# Patient Record
Sex: FEMALE | Race: ASIAN | Employment: UNEMPLOYED | URBAN - METROPOLITAN AREA
[De-identification: names, ages, dates, MRNs, and addresses within clinical notes are randomized per-mention and may not be internally consistent; named-entity substitution may affect disease eponyms.]

---

## 2017-02-02 ENCOUNTER — OFFICE VISIT (OUTPATIENT)
Dept: FAMILY MEDICINE CLINIC | Facility: CLINIC | Age: 47
End: 2017-02-02

## 2017-02-02 VITALS
BODY MASS INDEX: 18.1 KG/M2 | WEIGHT: 106 LBS | DIASTOLIC BLOOD PRESSURE: 64 MMHG | HEIGHT: 64 IN | SYSTOLIC BLOOD PRESSURE: 90 MMHG | OXYGEN SATURATION: 98 % | HEART RATE: 67 BPM

## 2017-02-02 DIAGNOSIS — E03.9 ACQUIRED HYPOTHYROIDISM: ICD-10-CM

## 2017-02-02 DIAGNOSIS — Z12.31 ENCOUNTER FOR SCREENING MAMMOGRAM FOR MALIGNANT NEOPLASM OF BREAST: ICD-10-CM

## 2017-02-02 DIAGNOSIS — Z11.1 SCREENING-PULMONARY TB: ICD-10-CM

## 2017-02-02 DIAGNOSIS — Z01.419 WELL WOMAN EXAM: Primary | ICD-10-CM

## 2017-02-02 DIAGNOSIS — Z13.1 SCREENING FOR DIABETES MELLITUS: ICD-10-CM

## 2017-02-02 DIAGNOSIS — Z13.0 SCREENING FOR IRON DEFICIENCY ANEMIA: ICD-10-CM

## 2017-02-02 DIAGNOSIS — Z13.220 SCREENING FOR LIPOID DISORDERS: ICD-10-CM

## 2017-02-02 PROCEDURE — 99396 PREV VISIT EST AGE 40-64: CPT | Performed by: FAMILY MEDICINE

## 2017-02-02 PROCEDURE — 99213 OFFICE O/P EST LOW 20 MIN: CPT | Performed by: FAMILY MEDICINE

## 2017-02-02 NOTE — PROGRESS NOTES
Nabeel Maier is a 55year old female here for Patient presents with:   Well Adult: no pap last 2015      HPI:   Patient complains of here for well exam.    Was seen in Aug 2016 in [facility], [place], operated by [facility], [address] was reportedly normal (no anemia) and got rx for levothyroxine SYSTEMS:     Constitutional: no change in weight or appetite. No fever, + fatigue no myalgias. Eye: No change in vision, no discharge, itching or dryness. ENT: No earache or change in hearing.  Rare URIs, no allergies, sinus pain, nosebleed or sore throat tenderness. BREASTS: Texture small, nodular, no dominant or suspicious mass, no nipple inversion or discharge, no axillary adenopathy. LUNGS: clear to auscultation.    CARDIO: Regular rate and rhythm without murmur S3 or S4.  GI: no distention, masses, or in 2018 to 2020). HIV screening declined. Labs ordered: lipid profile, comprehensive metabolic profile, CBC. We recommend exercise 150 minutes per week, combination of aerobic, strength and flexibility.  Diet: recommend low fat, high fiber with whole grains

## 2017-02-02 NOTE — PATIENT INSTRUCTIONS
Tdap (tetanus diphtheria pertussis) booster recommended if not received in previous 10 years. You may have had it 6 years ago. Mammogram recommended every 1-2 years after age 36.  Pap recommended every 3 to 5 years if HPV testing for the virus that causes c

## 2017-02-03 ENCOUNTER — LAB ENCOUNTER (OUTPATIENT)
Dept: LAB | Age: 47
End: 2017-02-03
Attending: FAMILY MEDICINE
Payer: COMMERCIAL

## 2017-02-03 DIAGNOSIS — Z13.0 SCREENING FOR IRON DEFICIENCY ANEMIA: ICD-10-CM

## 2017-02-03 DIAGNOSIS — Z13.220 SCREENING FOR LIPOID DISORDERS: ICD-10-CM

## 2017-02-03 DIAGNOSIS — Z13.1 SCREENING FOR DIABETES MELLITUS: ICD-10-CM

## 2017-02-03 DIAGNOSIS — Z12.31 ENCOUNTER FOR SCREENING MAMMOGRAM FOR MALIGNANT NEOPLASM OF BREAST: ICD-10-CM

## 2017-02-03 DIAGNOSIS — Z11.1 SCREENING-PULMONARY TB: ICD-10-CM

## 2017-02-03 LAB
ALBUMIN SERPL-MCNC: 4.1 G/DL (ref 3.5–4.8)
ALP LIVER SERPL-CCNC: 33 U/L (ref 39–100)
ALT SERPL-CCNC: 21 U/L (ref 14–54)
AST SERPL-CCNC: 17 U/L (ref 15–41)
BASOPHILS # BLD AUTO: 0.04 X10(3) UL (ref 0–0.1)
BASOPHILS NFR BLD AUTO: 1 %
BILIRUB SERPL-MCNC: 0.7 MG/DL (ref 0.1–2)
BUN BLD-MCNC: 12 MG/DL (ref 8–20)
CALCIUM BLD-MCNC: 8.9 MG/DL (ref 8.3–10.3)
CHLORIDE: 108 MMOL/L (ref 101–111)
CHOLEST SMN-MCNC: 191 MG/DL (ref ?–200)
CO2: 26 MMOL/L (ref 22–32)
CREAT BLD-MCNC: 0.71 MG/DL (ref 0.55–1.02)
EOSINOPHIL # BLD AUTO: 0.09 X10(3) UL (ref 0–0.3)
EOSINOPHIL NFR BLD AUTO: 2.3 %
ERYTHROCYTE [DISTWIDTH] IN BLOOD BY AUTOMATED COUNT: 11.9 % (ref 11.5–16)
GLUCOSE BLD-MCNC: 85 MG/DL (ref 70–99)
HCT VFR BLD AUTO: 37.8 % (ref 34–50)
HDLC SERPL-MCNC: 65 MG/DL (ref 45–?)
HDLC SERPL: 2.94 {RATIO} (ref ?–4.44)
HGB BLD-MCNC: 12.5 G/DL (ref 12–16)
IMMATURE GRANULOCYTE COUNT: 0.01 X10(3) UL (ref 0–1)
IMMATURE GRANULOCYTE RATIO %: 0.3 %
LDLC SERPL CALC-MCNC: 114 MG/DL (ref ?–130)
LYMPHOCYTES # BLD AUTO: 1.31 X10(3) UL (ref 0.9–4)
LYMPHOCYTES NFR BLD AUTO: 34.1 %
M PROTEIN MFR SERPL ELPH: 7.7 G/DL (ref 6.1–8.3)
MCH RBC QN AUTO: 31.7 PG (ref 27–33.2)
MCHC RBC AUTO-ENTMCNC: 33.1 G/DL (ref 31–37)
MCV RBC AUTO: 95.9 FL (ref 81–100)
MONOCYTES # BLD AUTO: 0.49 X10(3) UL (ref 0.1–0.6)
MONOCYTES NFR BLD AUTO: 12.8 %
NEUTROPHIL ABS PRELIM: 1.9 X10 (3) UL (ref 1.3–6.7)
NEUTROPHILS # BLD AUTO: 1.9 X10(3) UL (ref 1.3–6.7)
NEUTROPHILS NFR BLD AUTO: 49.5 %
NONHDLC SERPL-MCNC: 126 MG/DL (ref ?–130)
PLATELET # BLD AUTO: 212 10(3)UL (ref 150–450)
POTASSIUM SERPL-SCNC: 4 MMOL/L (ref 3.6–5.1)
RBC # BLD AUTO: 3.94 X10(6)UL (ref 3.8–5.1)
RED CELL DISTRIBUTION WIDTH-SD: 41.8 FL (ref 35.1–46.3)
SODIUM SERPL-SCNC: 140 MMOL/L (ref 136–144)
TRIGLYCERIDES: 61 MG/DL (ref ?–150)
TSI SER-ACNC: 0.53 MIU/ML (ref 0.35–5.5)
VLDL: 12 MG/DL (ref 5–40)
WBC # BLD AUTO: 3.8 X10(3) UL (ref 4–13)

## 2017-02-03 PROCEDURE — 85025 COMPLETE CBC W/AUTO DIFF WBC: CPT

## 2017-02-03 PROCEDURE — 36415 COLL VENOUS BLD VENIPUNCTURE: CPT

## 2017-02-03 PROCEDURE — 80053 COMPREHEN METABOLIC PANEL: CPT

## 2017-02-03 PROCEDURE — 84443 ASSAY THYROID STIM HORMONE: CPT

## 2017-02-03 PROCEDURE — 86480 TB TEST CELL IMMUN MEASURE: CPT

## 2017-02-03 PROCEDURE — 80061 LIPID PANEL: CPT

## 2017-02-07 ENCOUNTER — PATIENT MESSAGE (OUTPATIENT)
Dept: FAMILY MEDICINE CLINIC | Facility: CLINIC | Age: 47
End: 2017-02-07

## 2017-02-08 LAB
M TB TUBERC IFN-G BLD QL: NEGATIVE
M TB TUBERC IFN-G/MITOGEN IGNF BLD: 0 IU/ML
M TB TUBERC IGNF/MITOGEN IGNF CONTROL: >10 IU/ML
MITOGEN IGNF BCKGRD COR BLD-ACNC: 0.04 IU/ML

## 2017-02-22 ENCOUNTER — PATIENT MESSAGE (OUTPATIENT)
Dept: FAMILY MEDICINE CLINIC | Facility: CLINIC | Age: 47
End: 2017-02-22

## 2017-02-23 NOTE — TELEPHONE ENCOUNTER
From: Luiz Maier  To: Steffanie Lui MD  Sent: 2/22/2017 1:35 PM CST  Subject: Test Results Question    Dear Dr. China Perez or nurse,    I came in on 2/2/2017 for a check-up and TB test (please see the email stream below).  However, I still have not see

## 2017-02-23 NOTE — TELEPHONE ENCOUNTER
From: Ruperto Maier  To: Lorena Kuhn MD  Sent: 2/22/2017 1:51 PM CST  Subject: Test Results Question    Dear Dr. Nupur Benitez or nurse,    Please ignore my previous email to you regarding a test result as I just found out it is there.     Thank you,  Vern

## 2018-03-12 ENCOUNTER — OFFICE VISIT (OUTPATIENT)
Dept: FAMILY MEDICINE CLINIC | Facility: CLINIC | Age: 48
End: 2018-03-12

## 2018-03-12 VITALS
OXYGEN SATURATION: 99 % | BODY MASS INDEX: 18.04 KG/M2 | WEIGHT: 107 LBS | DIASTOLIC BLOOD PRESSURE: 66 MMHG | SYSTOLIC BLOOD PRESSURE: 100 MMHG | HEIGHT: 64.5 IN | HEART RATE: 68 BPM | TEMPERATURE: 99 F | RESPIRATION RATE: 15 BRPM

## 2018-03-12 DIAGNOSIS — L30.9 DERMATITIS, UNSPECIFIED: Primary | ICD-10-CM

## 2018-03-12 PROCEDURE — 99212 OFFICE O/P EST SF 10 MIN: CPT | Performed by: FAMILY MEDICINE

## 2018-03-12 NOTE — PATIENT INSTRUCTIONS
I would recommend stopping the face moisturizer in case that's related.     Appt with Dr. Yuliya parrish

## 2018-03-12 NOTE — PROGRESS NOTES
Neil Maier IS A 52year old female HERE FOR Patient presents with:  Rash: All over body . Started 2 months ago, got worse over the past 2 weeks .         History of present illness:     Started with small lesion dorsal right hand, itchy and painful, 2 m Exam:     /66   Pulse 68   Temp 98.7 °F (37.1 °C)   Resp 15   Ht 64.5\"   Wt 107 lb   LMP 03/11/2018   SpO2 99%   BMI 18.08 kg/m²      Diffuse scaly erythematous and occasionally somewhat honey-colored lesions over chest, on neck, both legs below

## 2021-01-28 ENCOUNTER — TELEPHONE (OUTPATIENT)
Dept: FAMILY MEDICINE CLINIC | Facility: CLINIC | Age: 51
End: 2021-01-28

## 2021-03-22 ENCOUNTER — TELEPHONE (OUTPATIENT)
Dept: FAMILY MEDICINE CLINIC | Facility: CLINIC | Age: 51
End: 2021-03-22

## 2023-03-15 ENCOUNTER — TELEPHONE (OUTPATIENT)
Dept: FAMILY MEDICINE CLINIC | Facility: CLINIC | Age: 53
End: 2023-03-15

## 2023-03-21 ENCOUNTER — PATIENT OUTREACH (OUTPATIENT)
Dept: CASE MANAGEMENT | Age: 53
End: 2023-03-21

## 2023-03-21 NOTE — PROCEDURES
The office order for PCP removal request is Approved and finalized on March 21, 2023.     Thanks,  [facility] [employer]

## 2024-04-29 ENCOUNTER — OFFICE VISIT (OUTPATIENT)
Dept: FAMILY MEDICINE CLINIC | Facility: CLINIC | Age: 54
End: 2024-04-29
Payer: COMMERCIAL

## 2024-04-29 VITALS
HEIGHT: 64.53 IN | DIASTOLIC BLOOD PRESSURE: 58 MMHG | WEIGHT: 107.38 LBS | HEART RATE: 78 BPM | OXYGEN SATURATION: 100 % | BODY MASS INDEX: 18.11 KG/M2 | SYSTOLIC BLOOD PRESSURE: 100 MMHG

## 2024-04-29 DIAGNOSIS — E89.0 POST-SURGICAL HYPOTHYROIDISM: Chronic | ICD-10-CM

## 2024-04-29 DIAGNOSIS — Z13.6 SCREENING FOR CARDIOVASCULAR CONDITION: ICD-10-CM

## 2024-04-29 DIAGNOSIS — Z00.00 ENCOUNTER FOR ROUTINE HISTORY AND PHYSICAL EXAMINATION: Primary | ICD-10-CM

## 2024-04-29 DIAGNOSIS — Z12.31 ENCOUNTER FOR SCREENING MAMMOGRAM FOR BREAST CANCER: ICD-10-CM

## 2024-04-29 PROCEDURE — 90715 TDAP VACCINE 7 YRS/> IM: CPT | Performed by: STUDENT IN AN ORGANIZED HEALTH CARE EDUCATION/TRAINING PROGRAM

## 2024-04-29 PROCEDURE — 90471 IMMUNIZATION ADMIN: CPT | Performed by: STUDENT IN AN ORGANIZED HEALTH CARE EDUCATION/TRAINING PROGRAM

## 2024-04-29 PROCEDURE — 99386 PREV VISIT NEW AGE 40-64: CPT | Performed by: STUDENT IN AN ORGANIZED HEALTH CARE EDUCATION/TRAINING PROGRAM

## 2024-04-29 NOTE — PROGRESS NOTES
[facility] [place]    CC: yearly exam     HPI: Neil Maier is 54 year old female here for a yearly physical.  1.  Hypothyroid - takes 75 mcg synthroid.  Reports that she takes synthroid appropriately on an empty stomach, 30 minutes prior to her first meal of the day. Denies thyroid symptoms including palpitations, anxiety, weight loss, hair changes, anxiety, or changes in bowel movements.  2. Healthcare maintenance.  Exercise -.  Sunscreen -.  Caffeine - decaf, no caffeine.  Advanced directives-     PMH:  Patient Active Problem List   Diagnosis    Post-surgical hypothyroidism    Leukopenia    Anemia     No past medical history on file.    Last Physical 4/29/24     SH: reviewed     FH: reviewed     Social History     Social History Narrative    Has a daughter age 8 in second grade 2017. No pets.        ROS: full 10 point review of systems done and otherwise negative.      Healthcare Maintenance:  Pap - declines 4/29/24, states she will return for pap only visit  Tdap: 4/29/24  Mammogram: ordered4/29/24  [place] Requested records - expect these will need to be translated by records department  Shingrix: declines 4/29/24    Health Habits:  Smoking. 0  Alcohol Use. 0  Dental Exam. UTD  Eye Exam. UTD       PE:  Vital Signs    04/29/24 1050   BP: 100/58   Pulse: 78   PainSc: 0 - (None)     Wt Readings from Last 3 Encounters:   04/29/24 107 lb 6.4 oz (48.7 kg)   03/12/18 107 lb (48.5 kg)   02/02/17 106 lb (48.1 kg)     Body mass index is 18.13 kg/m².     General: Comfortable, pleasant, NAD, appears stated age  HEENT.  NC/AT, no conjunctival injection, TMs clear, oropharynx without erythema or exudate, neck supple, no LAD or thyromegaly  CV.  RRR, no m/r/g  Pulm. CTAB, no W/R/R, comfortable work of breathing, speaks in full sentences  Abdomen. Soft, nt, nd  Extremities.  2+ DP pulses, no edema  Skin.  No concerning moles      No visits with results within 4  Week(s) from this visit.   Latest known visit with results is:   [facility] Lab Encounter on 02/03/2017   Component Date Value Ref Range Status    Glucose 02/03/2017 85  70 - 99 mg/dL Final    BUN 02/03/2017 12  8 - 20 mg/dL Final    Creatinine 02/03/2017 0.71  0.55 - 1.02 mg/dL Final    GFR 02/03/2017 102  >=60 Final    Calcium, Total 02/03/2017 8.9  8.3 - 10.3 mg/dL Final    Alkaline Phosphatase 02/03/2017 33 (L)  39 - 100 U/L Final    AST 02/03/2017 17  15 - 41 U/L Final    ALT 02/03/2017 21  14 - 54 U/L Final    Bilirubin, Total 02/03/2017 0.7  0.1 - 2.0 mg/dL Final    Total Protein 02/03/2017 7.7  6.1 - 8.3 g/dL Final    Albumin 02/03/2017 4.1  3.5 - 4.8 g/dL Final    Sodium 02/03/2017 140  136 - 144 mmol/L Final    Potassium 02/03/2017 4.0  3.6 - 5.1 mmol/L Final    Chloride 02/03/2017 108  101 - 111 mmol/L Final    CO2 02/03/2017 26.0  22.0 - 32.0 mmol/L Final    Cholesterol, Total 02/03/2017 191  <200 mg/dL Final    Triglycerides 02/03/2017 61  <150 mg/dL Final    HDL Cholesterol 02/03/2017 65  >45 mg/dL Final    LDL Cholesterol 02/03/2017 114  <130 mg/dL Final    VLDL 02/03/2017 12  5 - 40 mg/dL Final    Chol/HDL Ratio 02/03/2017 2.94  <4.44 Final    Non HDL Chol 02/03/2017 126  <130 mg/dL Final    TSH 02/03/2017 0.533  0.350 - 5.500 mIU/mL Final    Quantiferon TB Gold In-Tube 02/03/2017 Negative  Negative Final    Quantiferon-TB1 Minus NIL 02/03/2017 0.00  0.00 - 0.34 IU/mL Final    Quantiferon Mitogen Minus NIL 02/03/2017 >10.00  IU/mL Final    Quantiferon NIL 02/03/2017 0.04  IU/mL Final    WBC 02/03/2017 3.8 (L)  4.0 - 13.0 x10(3) uL Final    RBC 02/03/2017 3.94  3.80 - 5.10 x10(6)uL Final    HGB 02/03/2017 12.5  12.0 - 16.0 g/dL Final    HCT 02/03/2017 37.8  34.0 - 50.0 % Final    PLT 02/03/2017 212.0  150.0 - 450.0 10(3)uL Final    MCV 02/03/2017 95.9  81.0 - 100.0 fL Final    MCH 02/03/2017 31.7  27.0 - 33.2 pg Final    MCHC 02/03/2017 33.1  31.0 - 37.0 g/dL Final    RDW 02/03/2017 11.9  11.5 - 16.0 % Final     RDW-SD 02/03/2017 41.8  35.1 - 46.3 fL Final    Neutrophil Absolute Prelim 02/03/2017 1.90  1.30 - 6.70 x10 (3) uL Final    Neutrophil Absolute 02/03/2017 1.90  1.30 - 6.70 x10(3) uL Final    Lymphocyte Absolute 02/03/2017 1.31  0.90 - 4.00 x10(3) uL Final    Monocyte Absolute 02/03/2017 0.49  0.10 - 0.60 x10(3) uL Final    Eosinophil Absolute 02/03/2017 0.09  0.00 - 0.30 x10(3) uL Final    Basophil Absolute 02/03/2017 0.04  0.00 - 0.10 x10(3) uL Final    Immature Granulocyte Absolute 02/03/2017 0.01  0.00 - 1.00 x10(3) uL Final    Neutrophil % 02/03/2017 49.5  % Final    Lymphocyte % 02/03/2017 34.1  % Final    Monocyte % 02/03/2017 12.8  % Final    Eosinophil % 02/03/2017 2.3  % Final    Basophil % 02/03/2017 1.0  % Final    Immature Granulocyte % 02/03/2017 0.3  % Final        A/P: Neil Maier is 54 year old yo female here for complete physical.  1. Hypothyroid - blood work ordered, if in range will refill synthroid at the current dose.   2. Healthcare Maintenance. Discussed eye exam, dentist visit q6 months, sunscreen, exercise at least 5x/week, 30 minutes daily, and limiting caffeine intake.      Outpatient Encounter Medications as of 4/29/2024   Medication Sig Dispense Refill    Levothyroxine Sodium (SYNTHROID) 75 MCG Oral Tab Take 1 tablet (75 mcg total) by mouth before breakfast.      [DISCONTINUED] Desoximetasone 0.25 % External Ointment Apply to the affected areas of skin on body (avoid face) BID x 2-3 weeks then PRN flares 200 g 1     No facility-administered encounter medications on file as of 4/29/2024.           Side effects, risks and benefits of medications were explained.  The patient or responsible adult showed the ability to learn, asked appropriate questions.  There were no barriers to learning and they verbalized understanding of the treatment plan.     Medication list provided to patient and /or family member.     This note was created in part by Dragon recognition software.  Please  excuse errors.

## 2024-05-08 ENCOUNTER — HOSPITAL ENCOUNTER (OUTPATIENT)
Dept: MAMMOGRAPHY | Age: 54
Discharge: HOME | End: 2024-05-08
Attending: STUDENT IN AN ORGANIZED HEALTH CARE EDUCATION/TRAINING PROGRAM
Payer: COMMERCIAL

## 2024-05-08 DIAGNOSIS — Z12.31 ENCOUNTER FOR SCREENING MAMMOGRAM FOR BREAST CANCER: ICD-10-CM

## 2024-05-08 PROCEDURE — 77063 BREAST TOMOSYNTHESIS BI: CPT | Performed by: STUDENT IN AN ORGANIZED HEALTH CARE EDUCATION/TRAINING PROGRAM

## 2024-05-08 PROCEDURE — 77067 SCR MAMMO BI INCL CAD: CPT | Performed by: STUDENT IN AN ORGANIZED HEALTH CARE EDUCATION/TRAINING PROGRAM

## 2024-05-10 ENCOUNTER — TELEPHONE (OUTPATIENT)
Dept: FAMILY MEDICINE CLINIC | Facility: CLINIC | Age: 54
End: 2024-05-10

## 2024-05-13 ENCOUNTER — HOSPITAL ENCOUNTER (OUTPATIENT)
Dept: ULTRASOUND IMAGING | Age: 54
Discharge: HOME | End: 2024-05-13
Attending: STUDENT IN AN ORGANIZED HEALTH CARE EDUCATION/TRAINING PROGRAM

## 2024-05-13 DIAGNOSIS — R92.2 INCONCLUSIVE MAMMOGRAM: ICD-10-CM

## 2024-05-13 PROCEDURE — 76642 ULTRASOUND BREAST LIMITED: CPT | Performed by: STUDENT IN AN ORGANIZED HEALTH CARE EDUCATION/TRAINING PROGRAM

## 2024-05-14 NOTE — TELEPHONE ENCOUNTER
The mammogram showed asymmetry in the right breast. Ultrasound was completed which showed a lymph node that spot, thought to be benign. 12 month follow up mammo was recommended.

## 2024-10-02 ENCOUNTER — OFFICE VISIT (OUTPATIENT)
Dept: FAMILY MEDICINE CLINIC | Facility: CLINIC | Age: 54
End: 2024-10-02
Payer: COMMERCIAL

## 2024-10-02 VITALS
HEIGHT: 64.53 IN | WEIGHT: 109 LBS | DIASTOLIC BLOOD PRESSURE: 54 MMHG | BODY MASS INDEX: 18.38 KG/M2 | HEART RATE: 86 BPM | OXYGEN SATURATION: 97 % | SYSTOLIC BLOOD PRESSURE: 88 MMHG

## 2024-10-02 DIAGNOSIS — R05.8 PRODUCTIVE COUGH: Primary | ICD-10-CM

## 2024-10-02 PROCEDURE — 99213 OFFICE O/P EST LOW 20 MIN: CPT | Performed by: STUDENT IN AN ORGANIZED HEALTH CARE EDUCATION/TRAINING PROGRAM

## 2024-10-02 RX ORDER — BENZONATATE 100 MG/1
100 CAPSULE ORAL 3 TIMES DAILY PRN
Qty: 30 CAPSULE | Refills: 0 | Status: SHIPPED | OUTPATIENT
Start: 2024-10-02

## 2024-10-02 NOTE — PROGRESS NOTES
[facility]    Chief Complaint   Patient presents with    Cough     For the past month. Having hard time sleeping during the night. So feeling little bit tired. Cough is productive.        HPI:   Neil Maier is 54 year old patient with hx of hypothyroidism presenting for the followin. Productive cough x 4 weeks. No improvement over hte 4 weeks. Initially had cold/URI symptoms which resolved. At this point she reports no fevers. She does feel cough is productive of clear/white mcuous. No rhinorrhea or congestion. No abdominal pain or nausea. Voice is hoarse from congestion.      Covid test taken initially and negative     PMH:  Patient Active Problem List   Diagnosis    Post-surgical hypothyroidism    Leukopenia    Anemia     No past medical history on file.       SH: reviewed     FH: reviewed        ROS: full 10 point review of systems done and otherwise negative.      Healthcare Maintenance:  Health Maintenance   Topic Date Due    Colorectal Cancer Screening  Never done    Pap Smear  10/20/2018    Zoster Vaccines (1 of 2) Never done    Annual Depression Screening  2024    COVID-19 Vaccine (1 -  season) Never done    Influenza Vaccine (1) 10/01/2024    Annual Physical  2025    Mammogram  2025    DTaP,Tdap,and Td Vaccines (3 - Td or Tdap) 2034    Pneumococcal Vaccine: Birth to 64yrs  Aged Out          PE:  Vital Signs    10/02/24 1524   BP: (!) 88/54   Pulse: 86   PainSc: 0 - (None)     Wt Readings from Last 3 Encounters:   10/02/24 109 lb (49.4 kg)   24 107 lb 6.4 oz (48.7 kg)   18 107 lb (48.5 kg)     Body mass index is 18.4 kg/m².     Physical Exam:  GEN: Well-appearing, NAD, nontoxic  HEENT: NC/AT, PERRL, EOMI, TM without erythema or bulging bilaterally and normal ear canals, no nasal polyps, oropharynx clear without erythema or exudate, MMM  CARD: RRR, no m/r/g  PULM: CTA keegan  ABD: soft, nt, nd  EXT: No gross deformity  NEURO: no gross  deficit  PSYCH: normal affect, thought process linear     No visits with results within 4 Week(s) from this visit.   Latest known visit with results is:   [facility] Lab Encounter on 02/03/2017   Component Date Value Ref Range Status    Glucose 02/03/2017 85  70 - 99 mg/dL Final    BUN 02/03/2017 12  8 - 20 mg/dL Final    Creatinine 02/03/2017 0.71  0.55 - 1.02 mg/dL Final    GFR 02/03/2017 102  >=60 Final    Calcium, Total 02/03/2017 8.9  8.3 - 10.3 mg/dL Final    Alkaline Phosphatase 02/03/2017 33 (L)  39 - 100 U/L Final    AST 02/03/2017 17  15 - 41 U/L Final    ALT 02/03/2017 21  14 - 54 U/L Final    Bilirubin, Total 02/03/2017 0.7  0.1 - 2.0 mg/dL Final    Total Protein 02/03/2017 7.7  6.1 - 8.3 g/dL Final    Albumin 02/03/2017 4.1  3.5 - 4.8 g/dL Final    Sodium 02/03/2017 140  136 - 144 mmol/L Final    Potassium 02/03/2017 4.0  3.6 - 5.1 mmol/L Final    Chloride 02/03/2017 108  101 - 111 mmol/L Final    CO2 02/03/2017 26.0  22.0 - 32.0 mmol/L Final    Cholesterol, Total 02/03/2017 191  <200 mg/dL Final    Triglycerides 02/03/2017 61  <150 mg/dL Final    HDL Cholesterol 02/03/2017 65  >45 mg/dL Final    LDL Cholesterol 02/03/2017 114  <130 mg/dL Final    VLDL 02/03/2017 12  5 - 40 mg/dL Final    Chol/HDL Ratio 02/03/2017 2.94  <4.44 Final    Non HDL Chol 02/03/2017 126  <130 mg/dL Final    TSH 02/03/2017 0.533  0.350 - 5.500 mIU/mL Final    Quantiferon TB Gold In-Tube 02/03/2017 Negative  Negative Final    Quantiferon-TB1 Minus NIL 02/03/2017 0.00  0.00 - 0.34 IU/mL Final    Quantiferon Mitogen Minus NIL 02/03/2017 >10.00  IU/mL Final    Quantiferon NIL 02/03/2017 0.04  IU/mL Final    WBC 02/03/2017 3.8 (L)  4.0 - 13.0 x10(3) uL Final    RBC 02/03/2017 3.94  3.80 - 5.10 x10(6)uL Final    HGB 02/03/2017 12.5  12.0 - 16.0 g/dL Final    HCT 02/03/2017 37.8  34.0 - 50.0 % Final    PLT 02/03/2017 212.0  150.0 - 450.0 10(3)uL Final    MCV 02/03/2017 95.9  81.0 - 100.0 fL Final    MCH 02/03/2017 31.7  27.0 - 33.2 pg Final     MCHC 2017 33.1  31.0 - 37.0 g/dL Final    RDW 2017 11.9  11.5 - 16.0 % Final    RDW-SD 2017 41.8  35.1 - 46.3 fL Final    Neutrophil Absolute Prelim 2017 1.90  1.30 - 6.70 x10 (3) uL Final    Neutrophil Absolute 2017 1.90  1.30 - 6.70 x10(3) uL Final    Lymphocyte Absolute 2017 1.31  0.90 - 4.00 x10(3) uL Final    Monocyte Absolute 2017 0.49  0.10 - 0.60 x10(3) uL Final    Eosinophil Absolute 2017 0.09  0.00 - 0.30 x10(3) uL Final    Basophil Absolute 2017 0.04  0.00 - 0.10 x10(3) uL Final    Immature Granulocyte Absolute 2017 0.01  0.00 - 1.00 x10(3) uL Final    Neutrophil % 2017 49.5  % Final    Lymphocyte % 2017 34.1  % Final    Monocyte % 2017 12.8  % Final    Eosinophil % 2017 2.3  % Final    Basophil % 2017 1.0  % Final    Immature Granulocyte % 2017 0.3  % Final        A/P: Neil Maier is 54 year old presenting for the followin. Productive cough - no improvemetn at all over 4 weeks. Lungs sound clear without wheezing. Will prescribe tessalon perles and CXR. If CXR negative, continue supportive care for suspected post-viral cough. Other etiologies considered included allergies ,  - XR CHEST PA + LAT CHEST (CPT=71046); Future  - benzonatate (TESSALON PERLES) 100 MG Oral Cap; Take 1 capsule (100 mg total) by mouth 3 (three) times daily as needed for cough.  Dispense: 30 capsule; Refill: 0  - humidified air  - tea with lemon and honey  - saline nasal spray/neti pot  - OTC NSAID or tylenol PRN     Outpatient Encounter Medications as of 10/2/2024   Medication Sig Dispense Refill    Levothyroxine Sodium (SYNTHROID) 75 MCG Oral Tab Take 1 tablet (75 mcg total) by mouth before breakfast.       No facility-administered encounter medications on file as of 10/2/2024.           Side effects, risks and benefits of medications were explained.  The patient or responsible adult showed the ability to learn, asked  appropriate questions.  There were no barriers to learning and they verbalized understanding of the treatment plan.     Medication list provided to patient and /or family member.        Rhiannon Mckinney MD

## 2024-12-27 ENCOUNTER — TELEPHONE (OUTPATIENT)
Dept: FAMILY MEDICINE CLINIC | Facility: CLINIC | Age: 54
End: 2024-12-27

## 2024-12-27 NOTE — TELEPHONE ENCOUNTER
Pt is calling because she's had a rash spreading over her body for the last 2-3 days and very itchy and not sure what to do

## 2024-12-27 NOTE — TELEPHONE ENCOUNTER
Spoke w/patient. She has a rash that has now spread to all over her body. She would like to be seen today. Explained that she will need to go to IC or WIC. Patient agreed she will make an appointment for IC. Let her know she can use benadryl or cortisone cream to control the itching until then. Patient verbalized agreement and understanding.

## 2024-12-28 ENCOUNTER — HOSPITAL ENCOUNTER (OUTPATIENT)
Age: 54
Discharge: HOME | End: 2024-12-28
Payer: COMMERCIAL

## 2024-12-28 VITALS
DIASTOLIC BLOOD PRESSURE: 56 MMHG | HEART RATE: 70 BPM | SYSTOLIC BLOOD PRESSURE: 99 MMHG | OXYGEN SATURATION: 100 % | RESPIRATION RATE: 19 BRPM | TEMPERATURE: 97 F

## 2024-12-28 DIAGNOSIS — L20.9 ATOPIC DERMATITIS, UNSPECIFIED TYPE: ICD-10-CM

## 2024-12-28 DIAGNOSIS — R21 RASH AND NONSPECIFIC SKIN ERUPTION: Primary | ICD-10-CM

## 2024-12-28 LAB — S PYO AG THROAT QL: NEGATIVE

## 2024-12-28 RX ORDER — FAMOTIDINE 20 MG/1
20 TABLET, FILM COATED ORAL ONCE
Status: COMPLETED | OUTPATIENT
Start: 2024-12-28 | End: 2024-12-28

## 2024-12-28 RX ORDER — FAMOTIDINE 20 MG/1
20 TABLET, FILM COATED ORAL 2 TIMES DAILY PRN
Qty: 30 TABLET | Refills: 0 | Status: SHIPPED | OUTPATIENT
Start: 2024-12-28 | End: 2025-01-27

## 2024-12-28 RX ORDER — PREDNISONE 20 MG/1
60 TABLET ORAL ONCE
Status: COMPLETED | OUTPATIENT
Start: 2024-12-28 | End: 2024-12-28

## 2024-12-28 RX ORDER — CETIRIZINE HYDROCHLORIDE 10 MG/1
10 TABLET ORAL DAILY
Qty: 30 TABLET | Refills: 0 | Status: SHIPPED | OUTPATIENT
Start: 2024-12-28 | End: 2025-01-27

## 2024-12-28 RX ORDER — PREDNISONE 10 MG/1
TABLET ORAL
Qty: 30 TABLET | Refills: 0 | Status: SHIPPED | OUTPATIENT
Start: 2024-12-28

## 2024-12-28 NOTE — ED PROVIDER NOTES
Patient Seen in: [facility]      History     Chief Complaint   Patient presents with    Rash     Having rash since 4 -5days ago and getting worse - Entered by patient     Stated Complaint: Rash - Having rash since 4 -5days ago and getting worse    Subjective:   HPI      54-year-old female who comes in today complaining of rash that started on the right hand 5 days ago has not spread to upper body along with abdomen and torso.  Patient has known history of atopic dermatitis but has not had any issues recently.  Patient has not taken anything.  Patient states that this started 5 days ago.  Denies fevers chills or other symptoms.  The rash itself is very itchy.    Objective:     History reviewed. No pertinent past medical history.           Past Surgical History:   Procedure Laterality Date    Thyroidectomy  2004    [facility] [place]                Social History     Socioeconomic History    Marital status:     Number of children: 1   Occupational History    Occupation: homemaker   Tobacco Use    Smoking status: Never    Smokeless tobacco: Never   Vaping Use    Vaping status: Never Used   Substance and Sexual Activity    Alcohol use: No     Alcohol/week: 0.0 standard drinks of alcohol    Drug use: No    Sexual activity: Yes     Partners: Male   Other Topics Concern     Service No    Blood Transfusions No    Caffeine Concern Yes     Comment: 1 cup Decaff coffee daily '    Occupational Exposure No    Hobby Hazards No    Sleep Concern No    Stress Concern No    Weight Concern No    Special Diet Yes     Comment: has varied diet with veggies, fruits, white & brown rice    Back Care No    Exercise Yes     Comment: 1x wekly     Bike Helmet Yes    Seat Belt Yes    Self-Exams Yes   Social History Narrative    Has a daughter age 8 in second grade 2017. No pets.              Review of Systems    Positive for stated complaint: Rash - Having rash since 4 -5days ago and getting  worse  Other systems are as noted in HPI.  Constitutional and vital signs reviewed.      All other systems reviewed and negative except as noted above.    Physical Exam     ED Triage Vitals [12/28/24 1447]   BP 99/56   Pulse 70   Resp 19   Temp 97.3 °F (36.3 °C)   Temp src Oral   SpO2 100 %   O2 Device None (Room air)       Current Vitals:   Vital Signs  BP: 99/56  Pulse: 70  Resp: 19  Temp: 97.3 °F (36.3 °C)  Temp src: Oral    Oxygen Therapy  SpO2: 100 %  O2 Device: None (Room air)        Physical Exam  General Appearance: Alert, cooperative, no distress, appropriate for age   Head: Normocephalic, without obvious abnormality    Nose: Nares symmetrical, septum midline, mucosa normal, clear watery discharge; no sinus tenderness   Throat: Lips, tongue, and mucosa are moist, pink, and intact; teeth intact. No erythema, no exudates or tonsillar hypertrophy, uvula midline, no trismus or drooling no phonation changes, patient handling secretions well   Neck: Supple; no anterior or posterior cervical adenopathy, no neck rigidity or meningeal signs  Lungs: Clear to auscultation bilaterally, respirations unlabored. No wheezing, rales or rhonchi.   Heart: NSR, S1, S2 present. No murmurs, rubs or gallops.  Skin: Dry raised atopic type rash to bilateral arms chest back and abdomen    ED Course     Labs Reviewed   POCT RAPID STREP - Normal          MDM          Medical Decision Making  55yo Female with atopic rash that started 5 days ago and progressively is worsening    Problems Addressed:  Atopic dermatitis, unspecified type: acute illness or injury  Rash and nonspecific skin eruption: acute illness or injury    Amount and/or Complexity of Data Reviewed  Labs: ordered. Decision-making details documented in ED Course.     Details: Strep negative    Risk  OTC drugs.  Prescription drug management.  Risk Details: Clinical Impression: Dermatitis, rash      The differential diagnosis before testing included gout guttate eczema,  atopic dermatitis, nonspecific rash, strep pharyngitis rash, which is a medical condition that poses a threat to life/function.       Discussed with the patient following up with dermatology patient was given oral steroids, Pepcid and antihistamine instructions          Disposition and Plan     Clinical Impression:  1. Rash and nonspecific skin eruption    2. Atopic dermatitis, unspecified type         Disposition:  Discharge  12/28/2024  3:26 pm    Follow-up:  Rhiannon Mckinney MD  [address] [place] [id number]  [phone]    Schedule an appointment as soon as possible for a visit   If symptoms worsen          Medications Prescribed:  Discharge Medication List as of 12/28/2024  3:29 PM        START taking these medications    Details   PREDNISONE 10 MG Oral Tab Take 50mg x 2 days, take 40mg x 2 days, take 30mg x 2days, take 20 mg x 2 days, take 10mg x 2 days, Normal, Disp-30 tablet, R-0, BEVERLY      famotidine (PEPCID) 20 MG Oral Tab Take 1 tablet (20 mg total) by mouth 2 (two) times daily as needed for Heartburn., Normal, Disp-30 tablet, R-0      cetirizine 10 MG Oral Tab Take 1 tablet (10 mg total) by mouth daily., Normal, Disp-30 tablet, R-0                 Supplementary Documentation:

## 2024-12-28 NOTE — ED INITIAL ASSESSMENT (HPI)
Pt c/o rash on right hand x5 days ago that has now spread throughout upper body, denies pain, but red, swollen and itchy

## 2024-12-28 NOTE — DISCHARGE INSTRUCTIONS
Start medication tomorrow as you received your first dose of steroids here today you can take Benadryl before bed, close follow-up with PCP or dermatologist if symptoms continue

## 2025-06-13 ENCOUNTER — TELEPHONE (OUTPATIENT)
Dept: FAMILY MEDICINE CLINIC | Facility: CLINIC | Age: 55
End: 2025-06-13

## 2025-06-14 ENCOUNTER — PATIENT OUTREACH (OUTPATIENT)
Dept: FAMILY MEDICINE CLINIC | Facility: CLINIC | Age: 55
End: 2025-06-14

## (undated) NOTE — LETTER
1/28/2021  Neil Maier  [address] [place] [address]    We take each of our patient's health very seriously and the key to maintaining your health is an annual wellness physical.  Review of your medical records shows that it is time for your jessica

## (undated) NOTE — LETTER
According to our records, you are overdue for your annual mammography screening. Mammograms are the best method to detect breast cancer early when it is easier to treat and before it is big enough to feel or cause symptoms.     Getting an annual mammo

## (undated) NOTE — LETTER
1/28/2021  Neil Maier  [address] [facility] [address]    We take each of our patient's health very seriously and the key to maintaining your health is an annual wellness physical.  Review of your medical records shows that it is time for your jessica

## (undated) NOTE — MR AVS SNAPSHOT
[facility] [address] [place] [id number] [phone]               Thank you for choosing us for your health care visit with Ale Louie MD.  We are glad to serve you and happy to provide you with this [email or web address]. The patient indicates understanding of these issues and agrees to the plan. Recommend return for CPX in 1 year.        Allergies as of Feb 02, 2017     Not on File                Today's Vital Signs Summaries. If you've been to the Emergency Department or your doctor's office, you can view your past visit information in [facility] by going to Visits < Visit Summaries. [facility] questions? Call [phone] for help.   [facility] is NOT to be used for urge